# Patient Record
Sex: FEMALE | Race: BLACK OR AFRICAN AMERICAN | NOT HISPANIC OR LATINO
[De-identification: names, ages, dates, MRNs, and addresses within clinical notes are randomized per-mention and may not be internally consistent; named-entity substitution may affect disease eponyms.]

---

## 2021-01-01 ENCOUNTER — TRANSCRIPTION ENCOUNTER (OUTPATIENT)
Age: 36
End: 2021-01-01

## 2021-02-13 ENCOUNTER — TRANSCRIPTION ENCOUNTER (OUTPATIENT)
Age: 36
End: 2021-02-13

## 2021-08-18 ENCOUNTER — TRANSCRIPTION ENCOUNTER (OUTPATIENT)
Age: 36
End: 2021-08-18

## 2023-03-08 ENCOUNTER — APPOINTMENT (OUTPATIENT)
Dept: OBGYN | Facility: CLINIC | Age: 38
End: 2023-03-08

## 2023-03-08 VITALS — DIASTOLIC BLOOD PRESSURE: 80 MMHG | BODY MASS INDEX: 42.62 KG/M2 | SYSTOLIC BLOOD PRESSURE: 130 MMHG | WEIGHT: 233 LBS

## 2023-12-20 ENCOUNTER — APPOINTMENT (OUTPATIENT)
Dept: OBGYN | Facility: CLINIC | Age: 38
End: 2023-12-20
Payer: MEDICAID

## 2023-12-20 VITALS
HEIGHT: 62 IN | DIASTOLIC BLOOD PRESSURE: 84 MMHG | WEIGHT: 260 LBS | BODY MASS INDEX: 47.84 KG/M2 | HEART RATE: 89 BPM | SYSTOLIC BLOOD PRESSURE: 129 MMHG

## 2023-12-20 DIAGNOSIS — Z98.890 OTHER SPECIFIED POSTPROCEDURAL STATES: ICD-10-CM

## 2023-12-20 DIAGNOSIS — Z82.49 FAMILY HISTORY OF ISCHEMIC HEART DISEASE AND OTHER DISEASES OF THE CIRCULATORY SYSTEM: ICD-10-CM

## 2023-12-20 PROCEDURE — 99203 OFFICE O/P NEW LOW 30 MIN: CPT

## 2023-12-20 NOTE — PHYSICAL EXAM
[Chaperone Present] : A chaperone was present in the examining room during all aspects of the physical examination [Appropriately responsive] : appropriately responsive [Alert] : alert [No Acute Distress] : no acute distress [No Lymphadenopathy] : no lymphadenopathy [Soft] : soft [Non-tender] : non-tender [Non-distended] : non-distended [No HSM] : No HSM [No Lesions] : no lesions [No Mass] : no mass [Oriented x3] : oriented x3 [Examination Of The Breasts] : a normal appearance [No Masses] : no breast masses were palpable [Labia Majora] : normal [Labia Minora] : normal [Normal] : normal [Enlarged ___ wks] : enlarged [unfilled] ~Uweeks [Uterine Adnexae] : non-palpable

## 2023-12-22 ENCOUNTER — ASOB RESULT (OUTPATIENT)
Age: 38
End: 2023-12-22

## 2023-12-22 ENCOUNTER — APPOINTMENT (OUTPATIENT)
Dept: OBGYN | Facility: CLINIC | Age: 38
End: 2023-12-22
Payer: MEDICAID

## 2023-12-22 PROCEDURE — 76830 TRANSVAGINAL US NON-OB: CPT

## 2023-12-23 LAB
C TRACH RRNA SPEC QL NAA+PROBE: NOT DETECTED
HPV HIGH+LOW RISK DNA PNL CVX: NOT DETECTED
N GONORRHOEA RRNA SPEC QL NAA+PROBE: NOT DETECTED
SOURCE TP AMPLIFICATION: NORMAL

## 2023-12-26 NOTE — PLAN
[FreeTextEntry1] : Extensive review of above mentioned concerns. All questions and concerns addressed, patient expressed understanding. Encouraged to contact the office with any questions or concerns. Disc Mirena--need more recent imaging to assess myoma removal/resction/ablation Rec MRI to better manage and best minimally invasive approach as pt interested in retaining reproductive capacity See OMR previous imaging over a year ago

## 2024-01-01 LAB — CYTOLOGY CVX/VAG DOC THIN PREP: NORMAL

## 2024-01-03 ENCOUNTER — APPOINTMENT (OUTPATIENT)
Dept: OBGYN | Facility: CLINIC | Age: 39
End: 2024-01-03
Payer: MEDICAID

## 2024-01-03 PROCEDURE — 99213 OFFICE O/P EST LOW 20 MIN: CPT | Mod: 95

## 2024-01-03 NOTE — REASON FOR VISIT
[Follow-Up] : a follow-up evaluation of [Home] : at home, [unfilled] , at the time of the visit. [Medical Office: ([facility])___] : at the medical office located in  [Patient] : the patient

## 2024-01-03 NOTE — COUNSELING
[Nutrition/ Exercise/ Weight Management] : nutrition, exercise, weight management [Breast Self Exam] : breast self exam [Contraception/ Emergency Contraception/ Safe Sexual Practices] : contraception, emergency contraception, safe sexual practices [Medication Management] : medication management [Pre/Post Op Instructions] : pre/post op instructions

## 2024-01-09 ENCOUNTER — APPOINTMENT (OUTPATIENT)
Dept: OBGYN | Facility: CLINIC | Age: 39
End: 2024-01-09
Payer: MEDICAID

## 2024-01-09 ENCOUNTER — ASOB RESULT (OUTPATIENT)
Age: 39
End: 2024-01-09

## 2024-01-09 VITALS
HEIGHT: 62 IN | HEART RATE: 76 BPM | SYSTOLIC BLOOD PRESSURE: 138 MMHG | BODY MASS INDEX: 40.85 KG/M2 | DIASTOLIC BLOOD PRESSURE: 94 MMHG | WEIGHT: 222 LBS

## 2024-01-09 DIAGNOSIS — Z30.430 ENCOUNTER FOR INSERTION OF INTRAUTERINE CONTRACEPTIVE DEVICE: ICD-10-CM

## 2024-01-09 LAB
HCG UR QL: NEGATIVE
QUALITY CONTROL: YES

## 2024-01-09 PROCEDURE — 99214 OFFICE O/P EST MOD 30 MIN: CPT | Mod: 25

## 2024-01-09 PROCEDURE — 76830 TRANSVAGINAL US NON-OB: CPT

## 2024-01-09 PROCEDURE — 58300 INSERT INTRAUTERINE DEVICE: CPT

## 2024-01-09 RX ORDER — LEVONORGESTREL 52 MG/1
20 INTRAUTERINE DEVICE INTRAUTERINE
Qty: 0 | Refills: 0 | Status: COMPLETED | OUTPATIENT
Start: 2024-01-09

## 2024-01-09 RX ADMIN — LEVONORGESTREL MCG/DAY: 52 INTRAUTERINE DEVICE INTRAUTERINE at 00:00

## 2024-01-10 NOTE — PHYSICAL EXAM
[Chaperone Present] : A chaperone was present in the examining room during all aspects of the physical examination [Appropriately responsive] : appropriately responsive [Alert] : alert [No Acute Distress] : no acute distress [No Lymphadenopathy] : no lymphadenopathy [Regular Rate Rhythm] : regular rate rhythm [Soft] : soft [Non-tender] : non-tender [Non-distended] : non-distended [No HSM] : No HSM [No Lesions] : no lesions [No Mass] : no mass [Oriented x3] : oriented x3 [Labia Majora] : normal [Labia Minora] : normal [Normal] : normal [Enlarged ___ wks] : enlarged [unfilled] ~Uweeks [Uterine Adnexae] : non-palpable

## 2024-01-10 NOTE — PROCEDURE
[IUD Placement] : intrauterine device (IUD) placement [Time out performed] : Pre-procedure time out performed.  Patient's name, date of birth and procedure confirmed. [Consent Obtained] : Consent obtained [Risks] : risks [Benefits] : benefits [Alternatives] : alternatives [Patient] : patient [Infection] : infection [Bleeding] : bleeding [Pain] : pain [Expulsion] : expulsion [Failure] : failure [Uterine Perforation] : uterine perforation [Neg Pregnancy Test] : negative pregnancy test [Neg GC/Chlamydia] : negative GC/Chlamydia [Betadine] : Betadine [Easy Passage] : Easy passage [Mirena IUD] : Mirena IUD [US Guidance] : US Guidance [Tolerated Well] : Patient tolerated the procedure well [No Complications] : No complications [None] : None [de-identified] : lidocaine 3 cc [de-identified] : JOSE ALEJANDRO [de-identified] : allis [de-identified] : emr [de-identified] : emrt [de-identified] : emr

## 2024-01-10 NOTE — PLAN
[FreeTextEntry1] : Pt to control AUB with progestin IUD. MRI sched and will assess for myomectomy/UFE/Sonata or other uterine sparing prcedure--pt P0 and nt interested in hysterectomy at this time Extensive review of above mentioned concerns. All questions and concerns addressed, patient expressed understanding. Encouraged to contact the office with any questions or concerns.

## 2024-01-16 ENCOUNTER — APPOINTMENT (OUTPATIENT)
Dept: MRI IMAGING | Facility: CLINIC | Age: 39
End: 2024-01-16
Payer: MEDICAID

## 2024-01-16 ENCOUNTER — OUTPATIENT (OUTPATIENT)
Dept: OUTPATIENT SERVICES | Facility: HOSPITAL | Age: 39
LOS: 1 days | End: 2024-01-16
Payer: MEDICAID

## 2024-01-16 DIAGNOSIS — D21.9 BENIGN NEOPLASM OF CONNECTIVE AND OTHER SOFT TISSUE, UNSPECIFIED: ICD-10-CM

## 2024-01-16 PROCEDURE — A9585: CPT

## 2024-01-16 PROCEDURE — 72197 MRI PELVIS W/O & W/DYE: CPT | Mod: 26

## 2024-01-16 PROCEDURE — 72197 MRI PELVIS W/O & W/DYE: CPT

## 2024-01-30 ENCOUNTER — APPOINTMENT (OUTPATIENT)
Dept: OBGYN | Facility: CLINIC | Age: 39
End: 2024-01-30
Payer: MEDICAID

## 2024-01-30 PROCEDURE — 99213 OFFICE O/P EST LOW 20 MIN: CPT | Mod: 95

## 2024-01-30 NOTE — HISTORY OF PRESENT ILLNESS
[FreeTextEntry1] : Pt with AUB  Mirena placed 1/9--has been prolonged the bleeding. Rev MRI Pt declines appt with Dr. Charles--but will see Dr. Moura--desires laparoscopic approach

## 2024-01-30 NOTE — REASON FOR VISIT
[Home] : at home, [unfilled] , at the time of the visit. [Follow-Up] : a follow-up evaluation of [Medical Office: ([facility])___] : at the medical office located in  [Patient] : the patient

## 2024-02-12 ENCOUNTER — APPOINTMENT (OUTPATIENT)
Dept: OBGYN | Facility: CLINIC | Age: 39
End: 2024-02-12
Payer: MEDICAID

## 2024-02-12 VITALS
HEIGHT: 62 IN | BODY MASS INDEX: 40.48 KG/M2 | DIASTOLIC BLOOD PRESSURE: 83 MMHG | WEIGHT: 220 LBS | SYSTOLIC BLOOD PRESSURE: 149 MMHG | HEART RATE: 85 BPM

## 2024-02-12 DIAGNOSIS — I26.99 OTHER PULMONARY EMBOLISM W/OUT ACUTE COR PULMONALE: ICD-10-CM

## 2024-02-12 DIAGNOSIS — E66.01 MORBID (SEVERE) OBESITY DUE TO EXCESS CALORIES: ICD-10-CM

## 2024-02-12 PROCEDURE — 99215 OFFICE O/P EST HI 40 MIN: CPT

## 2024-02-12 RX ORDER — LEVONORGESTREL 52 MG/1
INTRAUTERINE DEVICE INTRAUTERINE
Refills: 0 | Status: ACTIVE | COMMUNITY
Start: 2024-01-01

## 2024-02-12 RX ORDER — APIXABAN 2.5 MG/1
2.5 TABLET, FILM COATED ORAL
Refills: 0 | Status: ACTIVE | COMMUNITY

## 2024-02-12 RX ORDER — SEMAGLUTIDE 1.34 MG/ML
2 INJECTION, SOLUTION SUBCUTANEOUS
Refills: 0 | Status: ACTIVE | COMMUNITY

## 2024-02-12 NOTE — DISCUSSION/SUMMARY
[FreeTextEntry1] : 37 yo P0 w/symptomatic ut myomas. Pt w/HMB and dysmenorrhea. Interested in future fertility. Med hx of elevated BMI (40), PE x2 (no diagnosed DVT) - on Eliquis, managed by pulm. Mirena IUD in place.  MRI images reviewed. Pt works as a .  Plan Schedule robotic shanti Med clearance  - plan for periop anticoagulation.  Note to Dr. Johnson. Yes

## 2024-02-12 NOTE — HISTORY OF PRESENT ILLNESS
[FreeTextEntry1] : 39 yo P0 here for eval and management of ut myomas. Pt w/HMB and 2-3 days of dysmenorrhea. Anemia. Pt on anticoagulation for management of h/o PE x 2 . Using Eliquis. Managed by pulmonologist. First PE after travelling for 10 hrs 1/2022 and 5/2023 Fibroids diagnosed ~ 2 years ago. Fibroids have doubled in size since then. Mirena IUD placed 1/2024  Referred by Dr. Johnson

## 2024-02-12 NOTE — PHYSICAL EXAM
[Normal] : normal [FreeTextEntry7] : soft, NT, ND [FreeTextEntry6] : Bulky mobile, ~16 wks, irregular. [FreeTextEntry5] : Mobile

## 2024-02-23 ENCOUNTER — TRANSCRIPTION ENCOUNTER (OUTPATIENT)
Age: 39
End: 2024-02-23

## 2024-04-15 ENCOUNTER — APPOINTMENT (OUTPATIENT)
Dept: OBGYN | Facility: CLINIC | Age: 39
End: 2024-04-15

## 2024-04-23 ENCOUNTER — APPOINTMENT (OUTPATIENT)
Dept: OBGYN | Facility: CLINIC | Age: 39
End: 2024-04-23
Payer: MEDICAID

## 2024-04-23 DIAGNOSIS — N92.0 EXCESSIVE AND FREQUENT MENSTRUATION WITH REGULAR CYCLE: ICD-10-CM

## 2024-04-23 DIAGNOSIS — N94.6 DYSMENORRHEA, UNSPECIFIED: ICD-10-CM

## 2024-04-23 PROCEDURE — 99212 OFFICE O/P EST SF 10 MIN: CPT

## 2024-04-23 RX ORDER — GLUC/MSM/COLGN2/HYAL/ANTIARTH3 375-375-20
TABLET ORAL
Refills: 0 | Status: ACTIVE | COMMUNITY

## 2024-04-23 RX ORDER — PSYLLIUM HUSK 0.4 G
CAPSULE ORAL
Refills: 0 | Status: ACTIVE | COMMUNITY

## 2024-04-23 RX ORDER — PNV NO.95/FERROUS FUM/FOLIC AC 28MG-0.8MG
TABLET ORAL
Refills: 0 | Status: ACTIVE | COMMUNITY

## 2024-04-23 NOTE — HISTORY OF PRESENT ILLNESS
[FreeTextEntry1] : excessive bleeding x 3 days with severe dysmenorrhea--resolved--iron tabs and motrin also helped Pt self D/C'ed Eliquis w/o discussion with hematologist--pt to restart again since traveling to [facility] for a wedding 4/25 then to [place] for wirk. Pt also stopped Ozempic--access issue. 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

## 2024-04-23 NOTE — PLAN
[FreeTextEntry1] : Pt to see hematologist preop as well as Dr. Moura Extensive review of above mentioned concerns. All questions and concerns addressed, patient expressed understanding. Encouraged to contact the office with any questions or concerns.

## 2024-04-23 NOTE — REASON FOR VISIT
[Home] : at home, [unfilled] , at the time of the visit. [Medical Office: ([facility])___] : at the medical office located in  [Patient] : the patient [Follow-Up] : a follow-up evaluation of

## 2024-05-29 ENCOUNTER — APPOINTMENT (OUTPATIENT)
Dept: OBGYN | Facility: CLINIC | Age: 39
End: 2024-05-29
Payer: MEDICAID

## 2024-05-29 DIAGNOSIS — D21.9 BENIGN NEOPLASM OF CONNECTIVE AND OTHER SOFT TISSUE, UNSPECIFIED: ICD-10-CM

## 2024-05-29 PROCEDURE — 99213 OFFICE O/P EST LOW 20 MIN: CPT

## 2024-05-29 NOTE — HISTORY OF PRESENT ILLNESS
[Home] : at home, [unfilled] , at the time of the visit. [Verbal consent obtained from patient] : the patient, [unfilled] [FreeTextEntry1] : 39 yo P0 here for preop chat prior to robotic shanti. Pt to see her hematologist to manage preop anticoagulant.

## 2024-05-29 NOTE — DISCUSSION/SUMMARY
[FreeTextEntry1] : 39 yo P0 here for preop chat prior to robotic shanti. Pt to see her hematologist to manage preop anticoagulant.  Plan Peterson to call about PST date after reschedule Keep appt for surgery.

## 2024-06-05 ENCOUNTER — OUTPATIENT (OUTPATIENT)
Dept: OUTPATIENT SERVICES | Facility: HOSPITAL | Age: 39
LOS: 1 days | End: 2024-06-05

## 2024-06-05 VITALS
RESPIRATION RATE: 16 BRPM | DIASTOLIC BLOOD PRESSURE: 87 MMHG | HEIGHT: 62.5 IN | SYSTOLIC BLOOD PRESSURE: 128 MMHG | HEART RATE: 82 BPM | WEIGHT: 229.94 LBS | TEMPERATURE: 98 F | OXYGEN SATURATION: 100 %

## 2024-06-05 DIAGNOSIS — D21.9 BENIGN NEOPLASM OF CONNECTIVE AND OTHER SOFT TISSUE, UNSPECIFIED: ICD-10-CM

## 2024-06-05 DIAGNOSIS — D25.9 LEIOMYOMA OF UTERUS, UNSPECIFIED: ICD-10-CM

## 2024-06-05 DIAGNOSIS — Z98.890 OTHER SPECIFIED POSTPROCEDURAL STATES: Chronic | ICD-10-CM

## 2024-06-05 LAB
ANION GAP SERPL CALC-SCNC: 12 MMOL/L — SIGNIFICANT CHANGE UP (ref 7–14)
BLD GP AB SCN SERPL QL: NEGATIVE — SIGNIFICANT CHANGE UP
BUN SERPL-MCNC: 8 MG/DL — SIGNIFICANT CHANGE UP (ref 7–23)
CALCIUM SERPL-MCNC: 8.9 MG/DL — SIGNIFICANT CHANGE UP (ref 8.4–10.5)
CHLORIDE SERPL-SCNC: 104 MMOL/L — SIGNIFICANT CHANGE UP (ref 98–107)
CO2 SERPL-SCNC: 20 MMOL/L — LOW (ref 22–31)
CREAT SERPL-MCNC: 0.52 MG/DL — SIGNIFICANT CHANGE UP (ref 0.5–1.3)
EGFR: 121 ML/MIN/1.73M2 — SIGNIFICANT CHANGE UP
GLUCOSE SERPL-MCNC: 87 MG/DL — SIGNIFICANT CHANGE UP (ref 70–99)
HCG UR QL: NEGATIVE — SIGNIFICANT CHANGE UP
HCT VFR BLD CALC: 36.9 % — SIGNIFICANT CHANGE UP (ref 34.5–45)
HGB BLD-MCNC: 11.6 G/DL — SIGNIFICANT CHANGE UP (ref 11.5–15.5)
MCHC RBC-ENTMCNC: 26.9 PG — LOW (ref 27–34)
MCHC RBC-ENTMCNC: 31.4 GM/DL — LOW (ref 32–36)
MCV RBC AUTO: 85.6 FL — SIGNIFICANT CHANGE UP (ref 80–100)
NRBC # BLD: 0 /100 WBCS — SIGNIFICANT CHANGE UP (ref 0–0)
NRBC # FLD: 0 K/UL — SIGNIFICANT CHANGE UP (ref 0–0)
PLATELET # BLD AUTO: 454 K/UL — HIGH (ref 150–400)
POTASSIUM SERPL-MCNC: 4.1 MMOL/L — SIGNIFICANT CHANGE UP (ref 3.5–5.3)
POTASSIUM SERPL-SCNC: 4.1 MMOL/L — SIGNIFICANT CHANGE UP (ref 3.5–5.3)
RBC # BLD: 4.31 M/UL — SIGNIFICANT CHANGE UP (ref 3.8–5.2)
RBC # FLD: 17.4 % — HIGH (ref 10.3–14.5)
RH IG SCN BLD-IMP: POSITIVE — SIGNIFICANT CHANGE UP
RH IG SCN BLD-IMP: POSITIVE — SIGNIFICANT CHANGE UP
SODIUM SERPL-SCNC: 136 MMOL/L — SIGNIFICANT CHANGE UP (ref 135–145)
WBC # BLD: 4.99 K/UL — SIGNIFICANT CHANGE UP (ref 3.8–10.5)
WBC # FLD AUTO: 4.99 K/UL — SIGNIFICANT CHANGE UP (ref 3.8–10.5)

## 2024-06-05 NOTE — H&P PST ADULT - HISTORY OF PRESENT ILLNESS
37 yo female dx with uterine leiomyoma for 3 years. Since dx uterine lieyoma has increase in size causing symptoms to worsen.  Pt c/o menorrhagia and 2-3 days of dysmenorrhea,  pt dx anemia requiring  treatment with Venefor last cycle 2023.  Mirena IUD placed 1/2024  Pt was referred to Dr. Zeny langston evaluation.  Pt Now present in Presurgical testing for preop evaluation for scheduled procedure Robotic laparoscopic myometomy  ? 37 yo female dx with uterine leiomyoma for 3 years. Since dx uterine leiomyoma has increase in size causing symptoms to worsen. Menorrhagia, dysmenorrhea  pelvic pain  Pt c/o menorrhagia and 2-3 days of dysmenorrhea,  Pt was dx with anemia requiring  treatment with Venefor last cycle 1/2023.  Mirena IUD placed 1/2024  Pt was referred to Dr. Zeny langston evaluation.  Pt Now present in Presurgical testing for preop evaluation for scheduled procedure Robotic laparoscopic myomectomy  ?

## 2024-06-05 NOTE — H&P PST ADULT - LYMPHATIC
posterior cervical L/posterior cervical R/anterior cervical L/anterior cervical R/supraclavicular L/supraclavicular R/axillary L

## 2024-06-05 NOTE — H&P PST ADULT - RESPIRATORY AND THORAX COMMENTS
Pulmonary Embolism Bilateral 2022 2022, 5/2023 unknown laterality  treated with Lovenox & ELIQUIS pt is currently on Eliquis

## 2024-06-05 NOTE — H&P PST ADULT - NSICDXFAMILYHX_GEN_ALL_CORE_FT
FAMILY HISTORY:  Father  Still living? Yes, Estimated age: Age Unknown  Family history of CVA, Age at diagnosis: Age Unknown  FH: hypertension, Age at diagnosis: Age Unknown  FH: myocardial infarction, Age at diagnosis: Age Unknown    Mother  Still living? Unknown  FH: hypertension, Age at diagnosis: Age Unknown

## 2024-06-05 NOTE — H&P PST ADULT - NSICDXPASTMEDICALHX_GEN_ALL_CORE_FT
PAST MEDICAL HISTORY:  Hypothyroidism     Pulmonary embolism     Uterine leiomyoma     Vitamin D deficiency

## 2024-06-05 NOTE — H&P PST ADULT - PROBLEM SELECTOR PLAN 1
Scheduled procedure  Robotic laparoscopic myomectomy  Pre op and Hibiclens instructions given and explained.  Avoid NSAIDs and OTC supplements.   Patient verbalized understanding   BMP, CBC, T&S, ucg results  pending

## 2024-06-20 ENCOUNTER — TRANSCRIPTION ENCOUNTER (OUTPATIENT)
Age: 39
End: 2024-06-20

## 2024-06-20 ENCOUNTER — OUTPATIENT (OUTPATIENT)
Dept: OUTPATIENT SERVICES | Facility: HOSPITAL | Age: 39
LOS: 1 days | Discharge: HOME | End: 2024-06-20
Payer: MEDICAID

## 2024-06-20 ENCOUNTER — APPOINTMENT (OUTPATIENT)
Dept: OBGYN | Facility: HOSPITAL | Age: 39
End: 2024-06-20

## 2024-06-20 ENCOUNTER — RESULT REVIEW (OUTPATIENT)
Age: 39
End: 2024-06-20

## 2024-06-20 VITALS
RESPIRATION RATE: 16 BRPM | OXYGEN SATURATION: 100 % | DIASTOLIC BLOOD PRESSURE: 80 MMHG | SYSTOLIC BLOOD PRESSURE: 130 MMHG | TEMPERATURE: 98 F | HEIGHT: 62.5 IN | HEART RATE: 75 BPM | WEIGHT: 229.94 LBS

## 2024-06-20 VITALS
DIASTOLIC BLOOD PRESSURE: 88 MMHG | SYSTOLIC BLOOD PRESSURE: 156 MMHG | RESPIRATION RATE: 14 BRPM | OXYGEN SATURATION: 98 % | HEART RATE: 70 BPM

## 2024-06-20 DIAGNOSIS — D21.9 BENIGN NEOPLASM OF CONNECTIVE AND OTHER SOFT TISSUE, UNSPECIFIED: ICD-10-CM

## 2024-06-20 DIAGNOSIS — Z98.890 OTHER SPECIFIED POSTPROCEDURAL STATES: Chronic | ICD-10-CM

## 2024-06-20 LAB — HCG UR QL: NEGATIVE — SIGNIFICANT CHANGE UP

## 2024-06-20 PROCEDURE — 58546 LAPARO-MYOMECTOMY COMPLEX: CPT

## 2024-06-20 PROCEDURE — 88305 TISSUE EXAM BY PATHOLOGIST: CPT | Mod: 26

## 2024-06-20 DEVICE — COSEAL 8ML: Type: IMPLANTABLE DEVICE | Status: FUNCTIONAL

## 2024-06-20 RX ORDER — OXYCODONE HYDROCHLORIDE 100 MG/5ML
5 SOLUTION ORAL ONCE
Refills: 0 | Status: DISCONTINUED | OUTPATIENT
Start: 2024-06-20 | End: 2024-06-20

## 2024-06-20 RX ORDER — HYDROMORPHONE HCL 0.2 MG/ML
1 INJECTION, SOLUTION INTRAVENOUS
Refills: 0 | Status: DISCONTINUED | OUTPATIENT
Start: 2024-06-20 | End: 2024-06-20

## 2024-06-20 RX ORDER — DEXTROSE MONOHYDRATE AND SODIUM CHLORIDE 5; .3 G/100ML; G/100ML
1000 INJECTION, SOLUTION INTRAVENOUS
Refills: 0 | Status: DISCONTINUED | OUTPATIENT
Start: 2024-06-20 | End: 2024-07-04

## 2024-06-20 RX ORDER — KETOROLAC TROMETHAMINE 30 MG/ML
30 INJECTION, SOLUTION INTRAMUSCULAR ONCE
Refills: 0 | Status: DISCONTINUED | OUTPATIENT
Start: 2024-06-20 | End: 2024-06-20

## 2024-06-20 RX ORDER — ONDANSETRON HYDROCHLORIDE 2 MG/ML
4 INJECTION INTRAMUSCULAR; INTRAVENOUS ONCE
Refills: 0 | Status: DISCONTINUED | OUTPATIENT
Start: 2024-06-20 | End: 2024-07-04

## 2024-06-20 RX ORDER — FERROUS SULFATE 325(65) MG
1 TABLET ORAL
Refills: 0 | DISCHARGE

## 2024-06-20 RX ORDER — OXYCODONE HYDROCHLORIDE 100 MG/5ML
1 SOLUTION ORAL
Qty: 8 | Refills: 0
Start: 2024-06-20

## 2024-06-20 RX ORDER — HYDROMORPHONE HCL 0.2 MG/ML
0.5 INJECTION, SOLUTION INTRAVENOUS
Refills: 0 | Status: DISCONTINUED | OUTPATIENT
Start: 2024-06-20 | End: 2024-06-20

## 2024-06-20 RX ORDER — ACETAMINOPHEN 325 MG
3 TABLET ORAL
Qty: 0 | Refills: 0 | DISCHARGE

## 2024-06-20 RX ADMIN — OXYCODONE HYDROCHLORIDE 5 MILLIGRAM(S): 100 SOLUTION ORAL at 23:55

## 2024-06-20 RX ADMIN — HYDROMORPHONE HCL 0.5 MILLIGRAM(S): 0.2 INJECTION, SOLUTION INTRAVENOUS at 21:46

## 2024-06-20 RX ADMIN — OXYCODONE HYDROCHLORIDE 5 MILLIGRAM(S): 100 SOLUTION ORAL at 23:24

## 2024-06-20 RX ADMIN — HYDROMORPHONE HCL 0.5 MILLIGRAM(S): 0.2 INJECTION, SOLUTION INTRAVENOUS at 22:15

## 2024-06-22 RX ORDER — APIXABAN 2.5 MG/1
1 TABLET, FILM COATED ORAL
Qty: 0 | Refills: 0 | DISCHARGE
Start: 2024-06-22

## 2024-07-02 ENCOUNTER — TRANSCRIPTION ENCOUNTER (OUTPATIENT)
Age: 39
End: 2024-07-02

## 2024-07-02 LAB — SURGICAL PATHOLOGY STUDY: SIGNIFICANT CHANGE UP

## 2024-07-12 ENCOUNTER — TRANSCRIPTION ENCOUNTER (OUTPATIENT)
Age: 39
End: 2024-07-12

## 2024-07-18 ENCOUNTER — APPOINTMENT (OUTPATIENT)
Dept: OBGYN | Facility: CLINIC | Age: 39
End: 2024-07-18
Payer: MEDICAID

## 2024-07-18 VITALS
BODY MASS INDEX: 41.59 KG/M2 | HEIGHT: 62 IN | DIASTOLIC BLOOD PRESSURE: 89 MMHG | WEIGHT: 226 LBS | SYSTOLIC BLOOD PRESSURE: 124 MMHG | HEART RATE: 72 BPM

## 2024-07-18 DIAGNOSIS — D21.9 BENIGN NEOPLASM OF CONNECTIVE AND OTHER SOFT TISSUE, UNSPECIFIED: ICD-10-CM

## 2024-07-18 DIAGNOSIS — N39.0 URINARY TRACT INFECTION, SITE NOT SPECIFIED: ICD-10-CM

## 2024-07-18 PROCEDURE — 99024 POSTOP FOLLOW-UP VISIT: CPT

## 2024-07-20 LAB — BACTERIA UR CULT: NORMAL

## 2025-01-09 ENCOUNTER — APPOINTMENT (OUTPATIENT)
Dept: OBGYN | Facility: CLINIC | Age: 40
End: 2025-01-09
Payer: MEDICAID

## 2025-01-09 ENCOUNTER — ASOB RESULT (OUTPATIENT)
Age: 40
End: 2025-01-09

## 2025-01-09 VITALS
WEIGHT: 232 LBS | SYSTOLIC BLOOD PRESSURE: 136 MMHG | BODY MASS INDEX: 42.69 KG/M2 | DIASTOLIC BLOOD PRESSURE: 90 MMHG | HEIGHT: 62 IN | HEART RATE: 90 BPM

## 2025-01-09 DIAGNOSIS — Z12.39 ENCOUNTER FOR OTHER SCREENING FOR MALIGNANT NEOPLASM OF BREAST: ICD-10-CM

## 2025-01-09 DIAGNOSIS — Z30.430 ENCOUNTER FOR INSERTION OF INTRAUTERINE CONTRACEPTIVE DEVICE: ICD-10-CM

## 2025-01-09 DIAGNOSIS — F10.90 ALCOHOL USE, UNSPECIFIED, UNCOMPLICATED: ICD-10-CM

## 2025-01-09 DIAGNOSIS — Z01.419 ENCOUNTER FOR GYNECOLOGICAL EXAMINATION (GENERAL) (ROUTINE) W/OUT ABNORMAL FINDINGS: ICD-10-CM

## 2025-01-09 DIAGNOSIS — Z30.431 ENCOUNTER FOR ROUTINE CHECKING OF INTRAUTERINE CONTRACEPTIVE DEVICE: ICD-10-CM

## 2025-01-09 DIAGNOSIS — R92.30 DENSE BREASTS, UNSPECIFIED: ICD-10-CM

## 2025-01-09 PROCEDURE — 99459 PELVIC EXAMINATION: CPT

## 2025-01-09 PROCEDURE — 99396 PREV VISIT EST AGE 40-64: CPT

## 2025-01-09 PROCEDURE — 76830 TRANSVAGINAL US NON-OB: CPT

## 2025-03-04 ENCOUNTER — APPOINTMENT (OUTPATIENT)
Dept: MAMMOGRAPHY | Facility: IMAGING CENTER | Age: 40
End: 2025-03-04

## 2025-03-04 ENCOUNTER — APPOINTMENT (OUTPATIENT)
Dept: ULTRASOUND IMAGING | Facility: IMAGING CENTER | Age: 40
End: 2025-03-04

## (undated) DEVICE — XI DRAPE COLUMN

## (undated) DEVICE — DRSG 2X2

## (undated) DEVICE — XI ARM FORCEP MARYLAND BIPOLAR

## (undated) DEVICE — SUT MAXON 0 30" GS-22

## (undated) DEVICE — GOWN XL

## (undated) DEVICE — INSUFFLATION NDL ETHICON ENDOPATH VERESS 14G 120MM

## (undated) DEVICE — SUT MONOCRYL 4-0 27" PS-2 UNDYED

## (undated) DEVICE — SUT VLOC 180 0 12" GS-21 GREEN

## (undated) DEVICE — SYR CONTROL LUER LOK 10CC

## (undated) DEVICE — SPONGE GAUZE 2 X 2" STERILE

## (undated) DEVICE — GLV 6.5 PROTEXIS (WHITE)

## (undated) DEVICE — GOWN LG

## (undated) DEVICE — UTERINE MANIPULATOR CONMED VCARE MED 34MM

## (undated) DEVICE — XI SEAL UNIVERSIAL 5-12MM

## (undated) DEVICE — NDL SAFETY BUTTERFLY 21G X 3/4

## (undated) DEVICE — XI TIP COVER

## (undated) DEVICE — BLADE SURGICAL #10 STAINLESS

## (undated) DEVICE — POSITIONER STRAP ARMBOARD VELCRO TS-30

## (undated) DEVICE — POSITIONER PINK PAD PIGAZZI SYSTEM

## (undated) DEVICE — TIP METZENBAUM SCISSOR MONOPOLAR ENDOCUT (ORANGE)

## (undated) DEVICE — XI DRAPE ARM

## (undated) DEVICE — SYR LUER LOK 10CC

## (undated) DEVICE — POSITIONER PURPLE ARM ONE STEP (LARGE)

## (undated) DEVICE — SUT VLOC 180 2-0 12" V-20 GREEN

## (undated) DEVICE — ENDOCATCH II 15MM

## (undated) DEVICE — TROCAR SURGIQUEST AIRSEAL 5MM X 100MM

## (undated) DEVICE — ELCTR BOVIE PENCIL SMOKE EVACUATION

## (undated) DEVICE — GLV 8 PROTEXIS (WHITE)

## (undated) DEVICE — XI ARM SCISSOR MONO CURVED

## (undated) DEVICE — TUBING STRYKEFLOW II SUCTION / IRRIGATOR

## (undated) DEVICE — GLV 7.5 PROTEXIS (WHITE)

## (undated) DEVICE — SYR LUER LOK 50CC

## (undated) DEVICE — UTERINE MANIPULATOR COOPER SURGICAL 5MM 33CM GREEN

## (undated) DEVICE — XI ARM FORCEP TENACULUM

## (undated) DEVICE — SUT VICRYL 2-0 27" SH UNDYED

## (undated) DEVICE — TRAP SPECIMEN SPUTUM 40CC

## (undated) DEVICE — SUT VLOC 180 0 6" GS-21 GREEN

## (undated) DEVICE — Device

## (undated) DEVICE — DRSG OPSITE 13.75 X 4"

## (undated) DEVICE — FOLEY TRAY 16FR 5CC LF UMETER CLOSED

## (undated) DEVICE — SOL IRR BAG NS 0.9% 1000ML

## (undated) DEVICE — SUT VICRYL 0 27" UR-6

## (undated) DEVICE — XI OBTURATOR OPTICAL BLADELESS 8MM

## (undated) DEVICE — PACK PERI GYN

## (undated) DEVICE — ELCTR BOVIE TIP BLADE INSULATED 2.75" EDGE

## (undated) DEVICE — DRSG TEGADERM 4X4.75"

## (undated) DEVICE — SHEARS COVIDIEN ENDO SHEAR 5MM X 31CM W UNIPOLAR CAUTERY

## (undated) DEVICE — SUT VLOC 180 2-0 12" GS-21 GREEN

## (undated) DEVICE — D HELP - CLEARVIEW CLEARIFY SYSTEM

## (undated) DEVICE — POSITIONER FOAM HEAD CRADLE (PINK)

## (undated) DEVICE — PREP BETADINE KIT

## (undated) DEVICE — XI ARM NEEDLE DRIVER MEGA

## (undated) DEVICE — DRSG MASTISOL

## (undated) DEVICE — TUBING AIRSEAL TRI-LUMEN FILTERED

## (undated) DEVICE — XI ARM FORCEP FENESTRATED BIPOLAR 8MM

## (undated) DEVICE — PACK ROBOTIC LIJ